# Patient Record
Sex: FEMALE | Race: WHITE
[De-identification: names, ages, dates, MRNs, and addresses within clinical notes are randomized per-mention and may not be internally consistent; named-entity substitution may affect disease eponyms.]

---

## 2017-01-07 ENCOUNTER — HOSPITAL ENCOUNTER (EMERGENCY)
Dept: HOSPITAL 25 - UCCORT | Age: 22
Discharge: HOME | End: 2017-01-07
Payer: COMMERCIAL

## 2017-01-07 VITALS — SYSTOLIC BLOOD PRESSURE: 119 MMHG | DIASTOLIC BLOOD PRESSURE: 64 MMHG

## 2017-01-07 DIAGNOSIS — K52.9: Primary | ICD-10-CM

## 2017-01-07 PROCEDURE — G0463 HOSPITAL OUTPT CLINIC VISIT: HCPCS

## 2017-01-07 PROCEDURE — 99212 OFFICE O/P EST SF 10 MIN: CPT

## 2017-12-02 ENCOUNTER — HOSPITAL ENCOUNTER (EMERGENCY)
Dept: HOSPITAL 25 - UCCORT | Age: 22
Discharge: HOME | End: 2017-12-02
Payer: COMMERCIAL

## 2017-12-02 VITALS — DIASTOLIC BLOOD PRESSURE: 89 MMHG | SYSTOLIC BLOOD PRESSURE: 117 MMHG

## 2017-12-02 DIAGNOSIS — J45.909: ICD-10-CM

## 2017-12-02 DIAGNOSIS — J20.9: Primary | ICD-10-CM

## 2017-12-02 PROCEDURE — 99213 OFFICE O/P EST LOW 20 MIN: CPT

## 2017-12-02 PROCEDURE — G0463 HOSPITAL OUTPT CLINIC VISIT: HCPCS

## 2017-12-02 NOTE — UC
Respiratory Complaint HPI





- HPI Summary


HPI Summary: 





Harsh cough sore throat 1 month ago dx with sinusitis unable to finish 

Amoxicillin due to nausea








- History of Current Complaint


Chief Complaint: UCGeneralIllness


Stated Complaint: COUGH,CONGESTION


Time Seen by Provider: 12/02/17 21:54


Hx Obtained From: Patient


Hx Last Menstrual Period: DAY 1 TODAY


Pregnant?: No


Onset/Duration: Gradual Onset, Lasting Weeks, Still Present


Timing: Constant


Severity Initially: Moderate


Severity Currently: Severe


Character: Cough: Nonproductive


Aggravating Factors: Deep Breaths, Recumbent Position


Alleviating Factors: Nothing


Associated Signs And Symptoms: Positive: Pleuritic Chest Pain, URI, Nasal 

Congestion, Sinus Discomfort





- Allergies/Home Medications


Allergies/Adverse Reactions: 


 Allergies











Allergy/AdvReac Type Severity Reaction Status Date / Time


 


seasonal allergies Allergy  Congestion Uncoded 12/02/17 21:15











Home Medications: 


 Home Medications





Bcp 1 tab PO DAILY 12/02/17 [History Confirmed 12/02/17]


Phenylephrine W/ Dm-GG [Mucinex Congestion & Coug 2.5-5-100 mg/5Ml] 1 liq PO 

DAILY 12/02/17 [History Confirmed 12/02/17]


Sertraline* [Zoloft*] 200 mg PO DAILY 12/02/17 [History Confirmed 12/02/17]


hydrOXYzine HCL TAB* [Atarax TAB 50 MG *] 100 mg PO BEDTIME PRN 12/02/17 [

History Confirmed 12/02/17]











PMH/Surg Hx/FS Hx/Imm Hx


Previously Healthy: No


Respiratory History: Asthma





- Surgical History


Surgical History: Yes


Surgery Procedure, Year, and Place: WISDOM TEETH EXTRACTIONS





- Family History


Known Family History: Positive: None, Diabetes


   Negative: Cardiac Disease, Hypertension





- Social History


Occupation: Student


Lives: With Family


Alcohol Use: Occasionally


Substance Use Type: None


Smoking Status (MU): Never Smoked Tobacco





- Immunization History


Most Recent Influenza Vaccination: [id number]


Vaccination Up to Date: Yes





Review of Systems


Constitutional: Negative


Skin: Negative


Eyes: Negative


ENT: Sore Throat


Respiratory: Cough


Cardiovascular: Negative


Gastrointestinal: Negative


Genitourinary: Negative


Motor: Negative


Neurovascular: Negative


Musculoskeletal: Negative


Neurological: Negative


Psychological: Negative


Is Patient Immunocompromised?: No


All Other Systems Reviewed And Are Negative: Yes





Physical Exam


Triage Information Reviewed: Yes


Appearance: Well-Appearing, No Pain Distress, Well-Nourished


Vital Signs: 


 Initial Vital Signs











Temp  97.9 F   12/02/17 21:00


 


Pulse  80   12/02/17 21:00


 


Resp  20   12/02/17 21:00


 


BP  117/89   12/02/17 21:00


 


Pulse Ox  100   12/02/17 21:00











Vital Signs Reviewed: Yes


Eye Exam: Normal


Eyes: Positive: Conjunctiva Clear


ENT Exam: Normal


ENT: Positive: Normal ENT inspection, Hearing grossly normal, Pharynx normal, 

TMs normal, Uvula midline.  Negative: Nasal congestion, Nasal drainage, 

Tonsillar swelling, Tonsillar exudate, Trismus, Muffled voice, Hoarse voice, 

Dental tenderness, Sinus tenderness


Dental Exam: Normal


Neck exam: Normal


Neck: Positive: Supple, Nontender, No Lymphadenopathy


Respiratory Exam: Normal


Respiratory: Positive: Chest non-tender, Lungs clear, No respiratory distress, 

No accessory muscle use, Decreased breath sounds


Cardiovascular Exam: Normal


Cardiovascular: Positive: RRR, No Murmur, Pulses Normal, Brisk Capillary Refill


Musculoskeletal Exam: Normal


Musculoskeletal: Positive: Strength Intact, ROM Intact, No Edema


Neurological Exam: Normal


Neurological: Positive: Alert, Muscle Tone Normal


Psychological Exam: Normal


Skin Exam: Normal





UC Diagnostic Evaluation





- Laboratory


O2 Sat by Pulse Oximetry: 100





Re-Evaluation





- Re-Evaluation


  ** First Eval


Change: Improved - feels better increase aeration after neb





Respiratory Course/Dx





- Course


Course Of Treatment: Prednisone, zithromax albuterol mdi with spacer, increase 

fluids follow with pcp prn





- Differential Dx/Diagnosis


Provider Diagnoses: Bronchitis with Bronchospasm





Discharge





- Discharge Plan


Condition: Stable


Disposition: HOME


Prescriptions: 


Albuterol HFA INHALER* [Ventolin HFA Inhaler*] 2 puff INH Q4H PRN #1 mdi


 PRN Reason: cough/chest tightness


Azithromycin TAB* [Zithromax TAB (Z-CANDIS) 250 mg #6 tabs] 250 mg PO DAILY #4 tab


predniSONE TAB* [Deltasone TAB*] 40 mg PO DAILY #9 tab


Patient Education Materials:  Acute Bronchitis (ED), Bronchospasm (ED), How to 

Use a Metered-Dose Inhaler and a Spacer (ED)


Referrals: 


Viivane Raymundo MD [Primary Care Provider] - If Needed

## 2018-02-19 ENCOUNTER — HOSPITAL ENCOUNTER (EMERGENCY)
Dept: HOSPITAL 25 - UCCORT | Age: 23
Discharge: HOME | End: 2018-02-19
Payer: COMMERCIAL

## 2018-02-19 VITALS — DIASTOLIC BLOOD PRESSURE: 69 MMHG | SYSTOLIC BLOOD PRESSURE: 112 MMHG

## 2018-02-19 DIAGNOSIS — Z32.02: ICD-10-CM

## 2018-02-19 DIAGNOSIS — R09.81: ICD-10-CM

## 2018-02-19 DIAGNOSIS — R05: ICD-10-CM

## 2018-02-19 DIAGNOSIS — R50.9: Primary | ICD-10-CM

## 2018-02-19 DIAGNOSIS — R51: ICD-10-CM

## 2018-02-19 DIAGNOSIS — K59.00: ICD-10-CM

## 2018-02-19 PROCEDURE — 99211 OFF/OP EST MAY X REQ PHY/QHP: CPT

## 2018-02-19 PROCEDURE — 84702 CHORIONIC GONADOTROPIN TEST: CPT

## 2018-02-19 PROCEDURE — G0463 HOSPITAL OUTPT CLINIC VISIT: HCPCS

## 2018-02-19 PROCEDURE — 87502 INFLUENZA DNA AMP PROBE: CPT

## 2018-02-19 NOTE — UC
UC General HPI





- HPI Summary


HPI Summary: 





pt is c/o sudden onset fever, chills, nasal congestion, headache and cough 

since this past friday. pt also notes she hasn't has a BM for a week and is  

having abdominal cramping with some nausea. she states this has been going on 

for about 3 years. she is under the care of Dr Reyes who thinks this may be 

ovarian cyst and/or endometriosis related. she notes that he is monitoring this 

for now. she has been prescribed miralax in past but hasn't tried it for this 

bout of constipation. pt offers she recently stopped her BC and may be 

ovulating making it feel a little worse. no assoc v/d/dysuria or vaginal 

discharge.





- History of Current Complaint


Stated Complaint: ABDOMINAL PAIN, FEVER, RESP


Time Seen by Provider: 02/19/18 15:59


Hx Obtained From: Patient


Hx Last Menstrual Period: DAY 1 TODAY


Onset/Duration: Sudden Onset


Timing: Constant


Aggravating: nothing


Alleviating: nothing


Associated Signs & Symptoms: Positive: Cough, Fever, Headache.  Negative: 

Diarrhea, Dysuria, SOB, Vomiting





- Allergy/Home Medications


Allergies/Adverse Reactions: 


 Allergies











Allergy/AdvReac Type Severity Reaction Status Date / Time


 


seasonal allergies Allergy  Congestion Uncoded 02/19/18 16:05











Home Medications: 


 Home Medications





Bupropion XL* [Wellbutrin XL *] 150 mg PO DAILY 02/19/18 [History Confirmed 02/ 19/18]


Folic Acid TAB* [Folvite TAB*] 1 mg PO DAILY 02/19/18 [History Confirmed 02/19/ 18]


Phenylephrine/Dm/Acetaminop/GG [Mucinex Fast-Max Cold-Flu Liq] 10 ml PO DAILY 

PRN 02/19/18 [History Confirmed 02/19/18]


Polyethylene Glycol 3350* [Miralax*] 17 gm PO DAILY PRN 02/19/18 [History 

Confirmed 02/19/18]


Prenatal Vit,Clay 74/Iron/Folic [Prenatal Low Iron Tablet] 1 each PO DAILY 02/19/ 18 [History Confirmed 02/19/18]











PMH/Surg Hx/FS Hx/Imm Hx


Respiratory History: Asthma





- Surgical History


Surgical History: Yes


Surgery Procedure, Year, and Place: WISDOM TEETH EXTRACTIONS





- Family History


Known Family History: Positive: None, Diabetes


   Negative: Cardiac Disease, Hypertension





- Social History


Occupation: Employed Part-time, Student


Alcohol Use: Occasionally


Substance Use Type: None


Smoking Status (MU): Never Smoked Tobacco





- Immunization History


Most Recent Influenza Vaccination: [id number]


Vaccination Up to Date: Yes





Review of Systems


Constitutional: Fever, Chills


Skin: Negative


Eyes: Negative


ENT: Sinus Congestion


Respiratory: Cough


Cardiovascular: Negative


Gastrointestinal: Negative


Genitourinary: Negative


Motor: Negative


Neurovascular: Negative


Musculoskeletal: Negative


Neurological: Headache


Psychological: Negative


Is Patient Immunocompromised?: No


All Other Systems Reviewed And Are Negative: Yes





Physical Exam


Triage Information Reviewed: Yes


Appearance: Well-Appearing


Vital Signs Reviewed: Yes


Eye Exam: Normal


ENT: Positive: Pharynx normal, Nasal congestion, TMs normal


Neck: Positive: Supple, Nontender, No Lymphadenopathy


Respiratory: Positive: Lungs clear, Normal breath sounds, No respiratory 

distress


Cardiovascular: Positive: RRR, No Murmur


Abdomen Description: Positive: Nontender, No Organomegaly, Soft.  Negative: CVA 

Tenderness (R), CVA Tenderness (L), Distended, Guarding


Bowel Sounds: Positive: Present


Musculoskeletal: Positive: No Edema


Neurological: Positive: Alert


Psychological: Positive: Age Appropriate Behavior


Skin Exam: Normal





Diagnostics





- Laboratory


Diagnostic Studies Completed/Ordered: urine hcg and rapid flu are negative





Course/Dx





- Course


Course Of Treatment: the acute complaint was rapid flu neg; however, it is FROILAN. 

there is nothing to suggest a bacterial infection. her abdominal complaint is a 

chronic issue being monitored by her OB, she does not have an acute abdomen 

here and the hcg is negative. she may resume her miralax for the constipation. 

i will refer pt back to her pcp's





- Differential Dx - Multi-Symptom


Provider Diagnoses: Influenza like illness. chronic abdominal discomfort. 

constipation.





Discharge





- Discharge Plan


Condition: Stable


Disposition: HOME


Patient Education Materials:  Influenza (DC), Constipation (ED), Abdominal Pain 

(ED)


Referrals: 


Viviane Raymundo MD [Primary Care Provider] - 5 Days


Ketan Reyes MD [Medical Doctor] - As Soon As Possible

## 2018-05-16 ENCOUNTER — HOSPITAL ENCOUNTER (EMERGENCY)
Dept: HOSPITAL 25 - UCCORT | Age: 23
Discharge: HOME | End: 2018-05-16
Payer: COMMERCIAL

## 2018-05-16 VITALS — SYSTOLIC BLOOD PRESSURE: 118 MMHG | DIASTOLIC BLOOD PRESSURE: 69 MMHG

## 2018-05-16 DIAGNOSIS — N76.0: ICD-10-CM

## 2018-05-16 DIAGNOSIS — O23.599: ICD-10-CM

## 2018-05-16 DIAGNOSIS — Z3A.00: ICD-10-CM

## 2018-05-16 DIAGNOSIS — O23.40: Primary | ICD-10-CM

## 2018-05-16 PROCEDURE — G0463 HOSPITAL OUTPT CLINIC VISIT: HCPCS

## 2018-05-16 PROCEDURE — 87086 URINE CULTURE/COLONY COUNT: CPT

## 2018-05-16 PROCEDURE — 87480 CANDIDA DNA DIR PROBE: CPT

## 2018-05-16 PROCEDURE — 87591 N.GONORRHOEAE DNA AMP PROB: CPT

## 2018-05-16 PROCEDURE — 99212 OFFICE O/P EST SF 10 MIN: CPT

## 2018-05-16 PROCEDURE — 84702 CHORIONIC GONADOTROPIN TEST: CPT

## 2018-05-16 PROCEDURE — 87510 GARDNER VAG DNA DIR PROBE: CPT

## 2018-05-16 PROCEDURE — 81003 URINALYSIS AUTO W/O SCOPE: CPT

## 2018-05-16 PROCEDURE — 87660 TRICHOMONAS VAGIN DIR PROBE: CPT

## 2018-05-16 PROCEDURE — 87491 CHLMYD TRACH DNA AMP PROBE: CPT

## 2018-05-16 NOTE — UC
Complaint Female HPI





- HPI Summary


HPI Summary: 





Pt c/o urinary frequency, urgency and dysuria X 3 days.  Pt also concerned 

about vaginal yeast infection.  Pt discovered she is pregnant last night.  LMP 2 /26/28





- History Of Current Complaint


Chief Complaint: UCGU


Stated Complaint: URINARY  (PREG)


Time Seen by Provider: 05/16/18 17:48


Hx Obtained From: Patient


Hx Last Menstrual Period: 02/23/18, states irregular


Pregnant?: Yes


Onset/Duration: Sudden Onset, Lasting Days, Still Present


Timing: Intermittent


Severity Initially: Mild


Severity Currently: Mild


Pain Intensity: 4


Character: Burning


Aggravating Factor(s): Urination


Associated Signs And Symptoms: Positive: Vaginal Discharge - yellow





- Risk Factors


Ectopic Pregnancy Risk Factor: Negative


Ovarian Torsion Risk Factor: Reproductive Age





- Allergies/Home Medications


Allergies/Adverse Reactions: 


 Allergies











Allergy/AdvReac Type Severity Reaction Status Date / Time


 


seasonal allergies Allergy  Congestion Uncoded 05/16/18 17:36














PMH/Surg Hx/FS Hx/Imm Hx


Previously Healthy: Yes





- Surgical History


Surgical History: Yes


Surgery Procedure, Year, and Place: WISDOM TEETH EXTRACTIONS





- Family History


Known Family History: Positive: None, Diabetes


   Negative: Cardiac Disease, Hypertension





- Social History


Occupation: Employed Full-time


Lives: With Family


Alcohol Use: Occasionally


Substance Use Type: None


Smoking Status (MU): Never Smoked Tobacco


Have You Smoked in the Last Year: No





- Immunization History


Most Recent Influenza Vaccination: [id number]


Vaccination Up to Date: Yes





Review of Systems


Constitutional: Negative


Skin: Negative


Eyes: Negative


ENT: Negative


Respiratory: Negative


Cardiovascular: Negative


Gastrointestinal: Negative


Genitourinary: Dysuria, Frequency, Urgency, Vaginal/Penile Discharge


Motor: Negative


Neurovascular: Negative


Musculoskeletal: Negative


Neurological: Negative


Psychological: Negative


Is Patient Immunocompromised?: No


All Other Systems Reviewed And Are Negative: Yes





Physical Exam


Triage Information Reviewed: Yes


Appearance: Well-Appearing


Vital Signs: 


 Initial Vital Signs











Temp  98.8 F   05/16/18 17:33


 


Pulse  68   05/16/18 17:33


 


Resp  17   05/16/18 17:33


 


BP  118/69   05/16/18 17:33


 


Pulse Ox  99   05/16/18 17:33











Vital Signs Reviewed: Yes


Eye Exam: Normal


ENT Exam: Normal


ENT: Positive: Hearing grossly normal


Dental Exam: Normal


Neck exam: Normal


Respiratory Exam: Normal


Cardiovascular Exam: Normal


Abdominal Exam: Normal


Musculoskeletal Exam: Normal


Neurological Exam: Normal


Psychological Exam: Normal


Skin Exam: Normal





 Complaint Female Dx





- Differential Dx/Diagnosis


Differential Diagnosis/HQI/PQRI: Pregnancy, Sexually Transmitted Disease, 

Urinary Tract Infection


Provider Diagnoses: UTI.  vaginitis





Discharge





- Sign-Out/Discharge


Documenting (check all that apply): Discharge/Admit/Transfer





- Discharge Plan


Condition: Stable


Disposition: HOME


Prescriptions: 


Cephalexin CAP* [Keflex 500 CAP*] 500 mg PO Q12H #14 cap


Miconazole Nitrate [Monistat 3 Combination Pa 200-2 mg-% (9Gm)] 1 kit [facility] 

BEDTIME #1 kit


Patient Education Materials:  Pregnancy (ED), Urinary Tract Infection in Women (

ED), Vaginitis (ED)


Referrals: 


Viviane Raymundo MD [Primary Care Provider] - 


Additional Instructions: 


Please follow up with your PCP and your OB provider as scheduled or needed. 





- Billing Disposition and Condition


Condition: STABLE


Disposition: HOME

## 2018-06-25 ENCOUNTER — HOSPITAL ENCOUNTER (EMERGENCY)
Dept: HOSPITAL 25 - UCCORT | Age: 23
Discharge: HOME | End: 2018-06-25
Payer: COMMERCIAL

## 2018-06-25 VITALS — DIASTOLIC BLOOD PRESSURE: 73 MMHG | SYSTOLIC BLOOD PRESSURE: 116 MMHG

## 2018-06-25 DIAGNOSIS — N39.0: Primary | ICD-10-CM

## 2018-06-25 DIAGNOSIS — Z88.1: ICD-10-CM

## 2018-06-25 DIAGNOSIS — R10.9: ICD-10-CM

## 2018-06-25 DIAGNOSIS — Z91.09: ICD-10-CM

## 2018-06-25 PROCEDURE — 81003 URINALYSIS AUTO W/O SCOPE: CPT

## 2018-06-25 PROCEDURE — G0463 HOSPITAL OUTPT CLINIC VISIT: HCPCS

## 2018-06-25 PROCEDURE — 87086 URINE CULTURE/COLONY COUNT: CPT

## 2018-06-25 PROCEDURE — 99212 OFFICE O/P EST SF 10 MIN: CPT

## 2018-06-25 NOTE — UC
Complaint Female HPI





- HPI Summary


HPI Summary: 





Pt c/o gradual o nset of left side flank pain, that radiates to LLQ. Pt is 10 

weeks pregnant. denies, vaginal bleeding or unusual discharge. 





- History Of Current Complaint


Hx Obtained From: Patient


Hx Last Menstrual Period: 02/23/18, states irregular


Pregnant?: Yes


Onset/Duration: Gradual Onset, Lasting Days, Still Present, Worse Since


Timing: Constant


Severity Initially: Mild


Severity Currently: Moderate


Pain Intensity: 8


Character: Dull, Colicy


Aggravating Factor(s): Nothing


Alleviating Factor(s): Nothing


Associated Signs And Symptoms: Positive: Back Pain





- Risk Factors


Ectopic Pregnancy Risk Factor: Negative


Ovarian Torsion Risk Factor: Reproductive Age





<Sobia Hamilton NP - Last Filed: 06/25/18 18:14>





<Andreea Perez - Last Filed: 06/25/18 19:31>





- History Of Current Complaint


Chief Complaint: UCGeneralIllness


Stated Complaint: BACK PAIN


Time Seen by Provider: 06/25/18 17:49





- Allergies/Home Medications


Allergies/Adverse Reactions: 


 Allergies











Allergy/AdvReac Type Severity Reaction Status Date / Time


 


cephalexin [From Keflex] Allergy  Rash And Verified 06/25/18 17:42





   Itching  


 


seasonal allergies Allergy  Congestion Uncoded 05/16/18 17:36











Home Medications: 


 Home Medications





Acetaminophen TAB* [Tylenol TAB*] 1,000 mg PO Q4H PRN 06/25/18 [History 

Confirmed 06/25/18]











PMH/Surg Hx/FS Hx/Imm Hx


Previously Healthy: Yes





- Surgical History


Surgical History: Yes


Surgery Procedure, Year, and Place: WISDOM TEETH EXTRACTIONS





- Family History


Known Family History: Positive: None, Diabetes


   Negative: Cardiac Disease, Hypertension





- Social History


Occupation: Employed Full-time


Lives: With Family


Alcohol Use: Occasionally


Substance Use Type: None


Smoking Status (MU): Never Smoked Tobacco


Have You Smoked in the Last Year: No





- Immunization History


Most Recent Influenza Vaccination: [id number]


Vaccination Up to Date: Yes





<Sobia Hamilton NP - Last Filed: 06/25/18 18:14>





Review of Systems


Constitutional: Negative


Skin: Negative


Eyes: Negative


ENT: Negative


Respiratory: Negative


Cardiovascular: Negative


Gastrointestinal: Negative


Genitourinary: Frequency, Other - left felice eflank pain


Motor: Negative


Neurovascular: Negative


Musculoskeletal: Negative


Neurological: Negative


Psychological: Negative


Is Patient Immunocompromised?: No


All Other Systems Reviewed And Are Negative: Yes





<Sobia Hamilton NP - Last Filed: 06/25/18 18:14>





Physical Exam


Triage Information Reviewed: Yes


Appearance: Pain Distress


Vital Signs: 


 Initial Vital Signs











Temp  98.1 F   06/25/18 17:36


 


Pulse  72   06/25/18 17:36


 


Resp  20   06/25/18 17:36


 


BP  116/73   06/25/18 17:36


 


Pulse Ox  100   06/25/18 17:36











Vital Signs Reviewed: Yes


Eye Exam: Normal


ENT Exam: Normal


Dental Exam: Normal


Neck exam: Normal


Respiratory Exam: Normal


Respiratory: Positive: No respiratory distress


Abdominal Exam: Other


Abdomen Description: Positive: CVA Tenderness (L)


Musculoskeletal Exam: Normal


Neurological Exam: Normal


Psychological Exam: Normal


Skin Exam: Normal





<Sobia Hamilton NP - Last Filed: 06/25/18 18:14>


Vital Signs: 


 Initial Vital Signs











Temp  98.1 F   06/25/18 17:36


 


Pulse  72   06/25/18 17:36


 


Resp  20   06/25/18 17:36


 


BP  116/73   06/25/18 17:36


 


Pulse Ox  100   06/25/18 17:36














<Andreea Perez - Last Filed: 06/25/18 19:31>





 Complaint Female Dx





- Course


Course Of Treatment: I discussed with the pt the possibility of kidney stone 

and limitation of imaging due to pregnancy.  Pt verbalized understanding and 

agreed to plan of care.





- Differential Dx/Diagnosis


Differential Diagnosis/HQI/PQRI: Urinary Tract Infection, Other - kidney stone


Provider Diagnoses: UTI.  flank pain





<Sobia Hamilton NP - Last Filed: 06/25/18 18:14>





Discharge





- Sign-Out/Discharge


Documenting (check all that apply): Discharge/Admit/Transfer





- Billing Disposition and Condition


Condition: STABLE


Disposition: Home





<Sobia Hamilton NP Last Filed: 06/25/18 18:14>





- Billing Disposition and Condition


Condition: STABLE


Disposition: Home





<Andreea Perez - Last Filed: 06/25/18 19:31>





- Discharge Plan


Condition: Stable


Disposition: HOME


Prescriptions: 


Nitrofurantoin Monohyd/M-Cryst [Macrobid 100 mg Capsule] 100 mg PO Q12H #14 cap


Patient Education Materials:  Urinary Tract Infection in Women (ED), Flank Pain 

(ED)


Referrals: 


Viviane Raymundo MD [Primary Care Provider] - If Needed





Attestation Statement


User Type: Provider - I was available for consult. This patient was seen by the 

KATIE. The patient was not presented to, seen by, or examined by me. -Sonny





<Andreea Perez - Last Filed: 06/25/18 19:31>

## 2018-11-01 ENCOUNTER — HOSPITAL ENCOUNTER (EMERGENCY)
Dept: HOSPITAL 25 - UCCORT | Age: 23
Discharge: HOME | End: 2018-11-01
Payer: COMMERCIAL

## 2018-11-01 VITALS — SYSTOLIC BLOOD PRESSURE: 114 MMHG | DIASTOLIC BLOOD PRESSURE: 62 MMHG

## 2018-11-01 DIAGNOSIS — J01.90: ICD-10-CM

## 2018-11-01 DIAGNOSIS — Z3A.28: ICD-10-CM

## 2018-11-01 DIAGNOSIS — O26.93: Primary | ICD-10-CM

## 2018-11-01 PROCEDURE — 87651 STREP A DNA AMP PROBE: CPT

## 2018-11-01 PROCEDURE — 99212 OFFICE O/P EST SF 10 MIN: CPT

## 2018-11-01 PROCEDURE — G0463 HOSPITAL OUTPT CLINIC VISIT: HCPCS

## 2018-11-01 NOTE — ED
Throat Pain/Nasal Congestion





- HPI Summary


HPI Summary: 





23 yr old female with the complaint of sinus pressure, post nasal drip, nasal 

yellow drainage.  Onset about a month ago.  She thought it was allergies but 

has not gotten better.  She is 28 weeks pregnant at this point.  She has had 

sinus infections before.  





- History of Current Complaint


Chief Complaint: UCRespiratory


Time Seen by Provider: 11/01/18 16:19





- Allergies/Home Medications


Allergies/Adverse Reactions: 


 Allergies











Allergy/AdvReac Type Severity Reaction Status Date / Time


 


cephalexin [From Keflex] Allergy  Rash And Verified 11/01/18 15:57





   Itching  


 


seasonal allergies Allergy  Congestion Uncoded 11/01/18 15:57











Home Medications: 


 Home Medications





Docusate CAP* [Colace Cap*] 1 cap BID 11/01/18 [History Confirmed 11/01/18]


Iron 1 tab DAILY 11/01/18 [History Confirmed 11/01/18]


Loratadine [Claritin 10 MG CAP] 1 cap DAILY 11/01/18 [History Confirmed 11/01/18

]











PMH/Surg Hx/FS Hx/Imm Hx


Endocrine/Hematology History: 


   Denies: Hx Diabetes


Cardiovascular History: 


   Denies: Hx Hypertension, Hx Pacemaker/ICD


Respiratory History: Reports: Hx Asthma


Sensory History: 


   Denies: Hx Hearing Aid


Psychiatric History: 


   Denies: Hx Panic Disorder





- Surgical History


Surgery Procedure, Year, and Place: WISDOM TEETH EXTRACTIONS


Infectious Disease History: No


Infectious Disease History: 


   Denies: History Other Infectious Disease, Traveled Outside the [place] in Last 30 

Days





- Family History


Known Family History: Positive: None, Diabetes


   Negative: Cardiac Disease, Hypertension





- Social History


Alcohol Use: None


Substance Use Type: Reports: None


Smoking Status (MU): Never Smoked Tobacco


Have You Smoked in the Last Year: No





Review of Systems


Positive: Nasal Discharge, Other - sinus pressure, post nasal drip.


All Other Systems Reviewed And Are Negative: Yes





Physical Exam


Triage Information Reviewed: Yes


Vital Signs On Initial Exam: 


 Initial Vitals











Temp Pulse Resp BP Pulse Ox


 


 97.6 F   79   16   114/62   98 


 


 11/01/18 15:59  11/01/18 15:59  11/01/18 15:59  11/01/18 15:59  11/01/18 15:59











Vital Signs Reviewed: Yes


Appearance: Positive: Well-Appearing, No Pain Distress


Skin: Positive: Warm, Skin Color Reflects Adequate Perfusion


Head/Face: Positive: Normal Head/Face Inspection


Eyes: Positive: EOMI


ENT: Positive: Pharynx normal, Nasal congestion, Nasal drainage, TMs normal, 

Sinus tenderness - bilateral frontal sinuses


Neck: Positive: Nontender


Respiratory/Lung Sounds: Positive: Clear to Auscultation, Breath Sounds Present


Cardiovascular: Positive: RRR.  Negative: Murmur


Abdomen Description: Negative: Distended


Musculoskeletal: Positive: Strength/ROM Intact


Neurological: Positive: Sensory/Motor Intact, Alert, Oriented to Person Place, 

Time, CN Intact II-III


Psychiatric: Positive: Normal





- Lori Coma Scale


Best Eye Response: 4 - Spontaneous


Best Motor Response: 6 - Obeys Commands


Best Verbal Response: 5 - Oriented


Coma Scale Total: 15





Diagnostics





- Vital Signs


 Vital Signs











  Temp Pulse Resp BP Pulse Ox


 


 11/01/18 15:59  97.6 F  79  16  114/62  98














- Laboratory


Lab Results: 


 Lab Results











  11/01/18 Range/Units





  16:14 


 


Group A Strep Rapid  Negative  (Negative)  











Lab Statement: Any lab studies that have been ordered have been reviewed, and 

results considered in the medical decision making process.





EENT Course/Dx





- Course


Course Of Treatment: 23 yr old female with sinusitis.  Rx with zithromax.  She 

has allergies to cephalexin.





- Diagnoses


Provider Diagnoses: 


 Sinusitis








Discharge





- Sign-Out/Discharge


Documenting (check all that apply): Patient Departure


All imaging exams completed and their final reports reviewed: No Studies





- Discharge Plan


Condition: Good


Disposition: HOME


Prescriptions: 


Azithromycin TAB* [Zithromax TAB (Z-CANDIS) 250 mg #6 tabs] 2 tab PO .TODAY, THEN 

1 DAILY #1 candis


Patient Education Materials:  Sinusitis (ED)


Referrals: 


Laura Koehler PA [Primary Care Provider] - 





- Billing Disposition and Condition


Condition: GOOD


Disposition: Home